# Patient Record
(demographics unavailable — no encounter records)

---

## 2025-04-01 NOTE — ASSESSMENT
[FreeTextEntry1] : 32 y/o male here today for follow up s/p vasectomy (01/2022) never went for post semen analysis c/o low testosterone, ED, feeling of incomplete emptying,  Patient states he is able to achieve erection intermittently  Eating citrus  Ordering semen analysis  Blood draw for PSA + Renal Panel Collecting urine for UA and Culture  Will F/U in case of positive results   no

## 2025-04-01 NOTE — HISTORY OF PRESENT ILLNESS
[FreeTextEntry1] : 34 y/o male here today for follow up s/p vasectomy (01/2022) never went for post semen analysis c/o low testosterone, ED, feeling of incomplete emptying,  Patient states he is able to achieve erection intermittently  Eating citrus

## 2025-04-02 NOTE — PHYSICAL EXAM
[No Acute Distress] : no acute distress [Well Nourished] : well nourished [Well Developed] : well developed [Well-Appearing] : well-appearing [Normal Sclera/Conjunctiva] : normal sclera/conjunctiva [Normal Outer Ear/Nose] : the outer ears and nose were normal in appearance [Normal Oropharynx] : the oropharynx was normal [No JVD] : no jugular venous distention [No Lymphadenopathy] : no lymphadenopathy [Supple] : supple [Thyroid Normal, No Nodules] : the thyroid was normal and there were no nodules present [No Respiratory Distress] : no respiratory distress  [No Accessory Muscle Use] : no accessory muscle use [Clear to Auscultation] : lungs were clear to auscultation bilaterally [Normal Rate] : normal rate  [Regular Rhythm] : with a regular rhythm [Normal S1, S2] : normal S1 and S2 [No Murmur] : no murmur heard [No Carotid Bruits] : no carotid bruits [No Abdominal Bruit] : a ~M bruit was not heard ~T in the abdomen [No Varicosities] : no varicosities [Pedal Pulses Present] : the pedal pulses are present [No Edema] : there was no peripheral edema [No Palpable Aorta] : no palpable aorta [No Extremity Clubbing/Cyanosis] : no extremity clubbing/cyanosis [Normal Appearance] : normal in appearance [Soft] : abdomen soft [Non Tender] : non-tender [Non-distended] : non-distended [No Masses] : no abdominal mass palpated [No HSM] : no HSM [Normal Bowel Sounds] : normal bowel sounds [Normal Posterior Cervical Nodes] : no posterior cervical lymphadenopathy [Normal Anterior Cervical Nodes] : no anterior cervical lymphadenopathy [No CVA Tenderness] : no CVA  tenderness [No Spinal Tenderness] : no spinal tenderness [No Joint Swelling] : no joint swelling [Grossly Normal Strength/Tone] : grossly normal strength/tone [No Rash] : no rash [Coordination Grossly Intact] : coordination grossly intact [No Focal Deficits] : no focal deficits [Normal Gait] : normal gait [Normal Affect] : the affect was normal [Alert and Oriented x3] : oriented to person, place, and time [Normal Insight/Judgement] : insight and judgment were intact

## 2025-04-02 NOTE — PLAN
[FreeTextEntry1] : suggest Zepbound or Wegovy patient declines Check bloods Healthy diet increase exercise etc.

## 2025-04-02 NOTE — CURRENT MEDS
[Takes medication as prescribed] : takes [None] : Patient does not have any barriers to medication adherence [Benzodiazepines] : benzodiazepines [Opioids] : opioids [Blood Thinners] : blood thinners [Muscle Relaxants] : muscle relaxants [Sedatives] : sedatives [FreeTextEntry1] : not on any of these

## 2025-04-02 NOTE — HEALTH RISK ASSESSMENT
[No] : No [0] : 2) Feeling down, depressed, or hopeless: Not at all (0) [PHQ-2 Negative - No further assessment needed] : PHQ-2 Negative - No further assessment needed [Very Good] : ~his/her~  mood as very good [No falls in past year] : Patient reported no falls in the past year [Never] : Never [NO] : No [Sexually Active] : sexually active [Fully functional (bathing, dressing, toileting, transferring, walking, feeding)] : Fully functional (bathing, dressing, toileting, transferring, walking, feeding) [Fully functional (using the telephone, shopping, preparing meals, housekeeping, doing laundry, using] : Fully functional and needs no help or supervision to perform IADLs (using the telephone, shopping, preparing meals, housekeeping, doing laundry, using transportation, managing medications and managing finances) [[id number]] : 0 [Change in mental status noted] : No change in mental status noted [Language] : denies difficulty with language [Behavior] : denies difficulty with behavior [Learning/Retaining New Information] : denies difficulty learning/retaining new information [Handling Complex Tasks] : denies difficulty handling complex tasks [Reasoning] : denies difficulty with reasoning [Spatial Ability and Orientation] : denies difficulty with spatial ability and orientation
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
## 2025-04-11 NOTE — HISTORY OF PRESENT ILLNESS
[FreeTextEntry1] : 33 y/o male here today for follow up  s/p vasectomy (01/2022) never went for post semen analysis c/o low testosterone, ED, feeling of incomplete emptying,  Patient states he is able to achieve erection intermittently  Eating citrus   FHx of PCA:   Tobacco use:   Last PSA:  ng/mL  Last creatinine:  mg/dL  Last UCx: negative  Uro meds: Tadalafil 10 mg  Testosterone: 273 (11/28/2023) - performed at 3.49PM

## 2025-04-11 NOTE — ASSESSMENT
[FreeTextEntry1] :  Denies hematuria, dysuria, flank pain, or any other urinary issues NICOLE  Eating citrus   FHx of PCA:   Tobacco use:   Last PSA:  ng/mL  Last creatinine:  mg/dL  Last UCx: negative  Uro meds: Tadalafil 10 mg   Testosterone: 273 (11/28/2023) - performed at 3.49PM  Order placed for semen analysis Order placed for Testosterone draw to be performed in [facility] lab A voided urine specimen was obtained and sent for ua/ucx  Will F/U in case of positive results

## 2025-06-20 NOTE — DISCUSSION/SUMMARY
[de-identified] : New rx for custom orthotics prescribed, provided [facility] contact info.  Supportive footwear recommended.  He will follow up prn.

## 2025-06-20 NOTE — HISTORY OF PRESENT ILLNESS
[de-identified] : Pt. is a 33 year old male who presents for evaluation of both feet. He has history of pes planus and right CN coalition. He wears custom orthotics consistently but his most recent pair are over five years old and no longer supportive. WB in supportive footwear today.
